# Patient Record
Sex: MALE | Race: BLACK OR AFRICAN AMERICAN
[De-identification: names, ages, dates, MRNs, and addresses within clinical notes are randomized per-mention and may not be internally consistent; named-entity substitution may affect disease eponyms.]

---

## 2017-05-01 ENCOUNTER — HOSPITAL ENCOUNTER (EMERGENCY)
Dept: HOSPITAL 61 - ER | Age: 57
Discharge: HOME | End: 2017-05-01
Payer: SELF-PAY

## 2017-05-01 VITALS — DIASTOLIC BLOOD PRESSURE: 84 MMHG | SYSTOLIC BLOOD PRESSURE: 127 MMHG

## 2017-05-01 VITALS — WEIGHT: 295 LBS | BODY MASS INDEX: 37.86 KG/M2 | HEIGHT: 74 IN

## 2017-05-01 DIAGNOSIS — R07.89: ICD-10-CM

## 2017-05-01 DIAGNOSIS — Z88.0: ICD-10-CM

## 2017-05-01 DIAGNOSIS — R53.1: ICD-10-CM

## 2017-05-01 DIAGNOSIS — N39.0: Primary | ICD-10-CM

## 2017-05-01 LAB
BACTERIA #/AREA URNS HPF: 0 /HPF
BILIRUB UR QL STRIP: NEGATIVE
GLUCOSE UR STRIP-MCNC: NEGATIVE MG/DL
NITRITE UR QL STRIP: NEGATIVE
PH UR STRIP: 5.5 [PH]
PROT UR STRIP-MCNC: NEGATIVE MG/DL
RBC #/AREA URNS HPF: 0 /HPF (ref 0–2)
SP GR UR STRIP: 1.02
SQUAMOUS #/AREA URNS LPF: (no result) /LPF
UROBILINOGEN UR-MCNC: 1 MG/DL

## 2017-05-01 PROCEDURE — 99285 EMERGENCY DEPT VISIT HI MDM: CPT

## 2017-05-01 PROCEDURE — 87086 URINE CULTURE/COLONY COUNT: CPT

## 2017-05-01 PROCEDURE — 71020: CPT

## 2017-05-01 PROCEDURE — 81001 URINALYSIS AUTO W/SCOPE: CPT

## 2017-05-01 PROCEDURE — 93005 ELECTROCARDIOGRAM TRACING: CPT

## 2017-05-01 NOTE — EKG
[facility]

               [address] [place] [address]

Test Date:    2017               Test Time:    13:44:06

Pat Name:     KELI GIL            Department:   

Patient ID:   [id number]           Room:          

Gender:       M                        Technician:   

:          1960               Requested By: RICO BERMEO

Order Number: [id number]            Reading MD:   Jolie Nesbitt

                                 Measurements

Intervals                              Axis          

Rate:         68                       P:            48

NM:           192                      QRS:          12

QRSD:         90                       T:            -6

QT:           370                                    

QTc:          394                                    

                           Interpretive Statements

SINUS RHYTHM

NORMAL EKG

Electronically Signed On 2017 21:17:23 CDT by Jolie Nesbitt

## 2017-05-01 NOTE — RAD
Indication chest pain tachycardia nausea. Aeration of symptoms 2 days.



PA and lateral views of the chest were obtained. Comparison is made to an exam

9/1/2016.



On the frontal image there is a density at the right lung base medially. This

could represent a vascular shadow. A small area of infiltrate or pulmonary

nodule cannot be entirely excluded. Follow-up imaging should be considered.

The left lung is clear. There is no pleural fluid or pneumothorax. The heart

and pulmonary vessels are normal.



IMPRESSION: Possible small nodule or infiltrate at the right lung base

medially

## 2017-05-01 NOTE — ED.ADGEN
Past Medical History


Past Medical History:  No Pertinent History


Past Surgical History:  Other


Additional Past Surgical Histo:  left knee, right shoulder


Alcohol Use:  None


Drug Use:  None





Adult General


Chief Complaint


Chief Complaint:  FEVER





HPI


HPI


Patient is a 57  year old -American male presents with those, flank pain 

and urinary frequency. No fever, sweats. Reports one episode of vomiting 

yesterday x1. Patient reports recent episode of chest pain which is resolved 

since vomiting. No other acute symptoms or complaints. History of urinary tract 

infections or prostatitis.





Review of Systems


Review of Systems


Review of system as per HPI.





Current Medications


Current Medications








 Current Medications








 Medications


  (Trade)  Dose


 Ordered  Sig/Aly  Start Time


 Stop Time Status Last Admin


Dose Admin


 


 Levofloxacin


  (Levaquin)  500 mg  1X  ONCE  5/1/17 14:15


 5/1/17 14:16 DC 5/1/17 14:17


500 MG


 


 Ondansetron HCl


  (Zofran Odt)  4 mg  1X  ONCE  5/1/17 14:15


 5/1/17 14:16 DC 5/1/17 14:17


4 MG














Allergies


Allergies





 Allergies








Coded Allergies Type Severity Reaction Last Updated Verified


 


  Penicillins Allergy Intermediate  5/1/17 Yes











Physical Exam


Physical Exam


Constitutional: Well developed, well nourished, no acute distress, non-toxic 

appearance. 


HENT: Normocephalic, atraumatic, bilateral external ears normal, oropharynx 

moist, no oral exudates, nose normal.


Eyes: PERRLA, EOMI, conjunctiva normal. 


Neck: Normal range of motion, no tenderness, supple. 


Cardiovascular:Heart rate regular rhythm, no murmur. 


Lungs & Thorax:  Bilateral breath sounds clear to auscultation. 


Abdomen: Bowel sounds normal, soft, no tenderness. 


Skin: Warm, dry. 


Back: No tenderness. 


Extremities: No tenderness. 


Neurologic: Alert and oriented X 3, normal motor function, normal sensory 

function, no focal deficits noted.


Psychologic: Affect normal.





Current Patient Data


Vital Signs





 Vital Signs








  Date Time  Temp Pulse Resp B/P Pulse Ox O2 Delivery O2 Flow Rate FiO2


 


5/1/17 12:23 98.1 85 20 127/84 100 Room Air  





 98.1       








Lab Values





 Laboratory Tests








Test


  5/1/17


12:50


 


Urine Collection Type Unknown  


 


Urine Color Yellow  


 


Urine Clarity Clear  


 


Urine pH 5.5  


 


Urine Specific Gravity 1.020  


 


Urine Protein


  Negativemg/dL


(NEG-TRACE)


 


Urine Glucose (UA)


  Negativemg/dL


(NEG)


 


Urine Ketones (Stick)


  Negativemg/dL


(NEG)


 


Urine Blood


  Negative (NEG)


 


 


Urine Nitrite


  Negative (NEG)


 


 


Urine Bilirubin


  Negative (NEG)


 


 


Urine Urobilinogen Dipstick


  1.0mg/dL (0.2


mg/dL)


 


Urine Leukocyte Esterase


  Moderate (NEG)


 


 


Urine RBC 0/HPF (0-2)  


 


Urine WBC


  11-20/HPF


(0-4)


 


Urine Squamous Epithelial


Cells Mod/LPF  


 


 


Urine Bacteria 0/HPF (0-FEW)  


 


Urine Mucus Slight/LPF  











EKG


EKG


[EKG: Normal sinus rhythm, no acute ST-T wave changes.]





Radiology/Procedures


Radiology/Procedures


[Chest x-ray: No acute cardiopulmonary disease.]


Impressions:


Urinary tract infection and generalized weakness





Course & Med Decision Making


Course & Med Decision Making


Pertinent Labs and Imaging studies reviewed. (See chart for details)





[Antibiotics given. Will continue supportive treatment with PCP follow-up. 

Return precautions reviewed.]





Dragon Disclaimer


Dragon Disclaimer


This electronic medical record was generated, in whole or in part, using a 

voice recognition dictation system.








RICO BERMEO DO May 1, 2017 13:39

## 2018-11-01 ENCOUNTER — HOSPITAL ENCOUNTER (EMERGENCY)
Dept: HOSPITAL 61 - ER | Age: 58
Discharge: HOME | End: 2018-11-01
Payer: SELF-PAY

## 2018-11-01 VITALS
WEIGHT: 285 LBS | HEIGHT: 75 IN | BODY MASS INDEX: 35.43 KG/M2 | SYSTOLIC BLOOD PRESSURE: 156 MMHG | DIASTOLIC BLOOD PRESSURE: 82 MMHG
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
## 2018-11-01 DIAGNOSIS — Z88.0: ICD-10-CM

## 2018-11-01 DIAGNOSIS — M25.511: ICD-10-CM
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
## 2018-11-01 DIAGNOSIS — M54.5: ICD-10-CM

## 2018-11-01 DIAGNOSIS — S13.4XXA: Primary | ICD-10-CM
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
## 2018-11-01 DIAGNOSIS — G89.29: ICD-10-CM
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
## 2018-11-01 PROCEDURE — 73030 X-RAY EXAM OF SHOULDER: CPT

## 2018-11-01 PROCEDURE — 72110 X-RAY EXAM L-2 SPINE 4/>VWS: CPT

## 2018-11-01 PROCEDURE — 72050 X-RAY EXAM NECK SPINE 4/5VWS: CPT

## 2018-11-01 PROCEDURE — 96372 THER/PROPH/DIAG INJ SC/IM: CPT

## 2018-11-01 PROCEDURE — 99284 EMERGENCY DEPT VISIT MOD MDM: CPT

## 2018-11-01 RX ADMIN — CYCLOBENZAPRINE HYDROCHLORIDE ONE MG: 10 TABLET, FILM COATED ORAL at 20:15

## 2018-11-01 RX ADMIN — KETOROLAC TROMETHAMINE ONE MG: 30 INJECTION, SOLUTION INTRAMUSCULAR at 20:16
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
